# Patient Record
(demographics unavailable — no encounter records)

---

## 2025-02-05 NOTE — PHYSICAL EXAM
[de-identified] : Constitutional: The patient appears well developed, well nourished. Examination of patients ability to communicate functionally was normal.       Neurologic: Coordination is normal. Alert and oriented to time, place and person. No evidence of mood disorder, calm affect.           RIGHT  KNEE  : Inspection of the knee is as follows: moderate effusion. Positive diffuse at the knee and lower leg.  ecchymosis, no streaking, , no atrophy, no deformities of the quad tendon and no deformities of patellar tendon.  THERE IS MILD ERYTHEMA DISTAL LOWER LEG ANTEROLATERAL LEG. B/L LE EDEMA     Palpation of the knee is as follows: medial joint line tenderness, lateral joint line tenderness, medial facet of patella tenderness, lateral facet of patella tenderness and crepitus. no palpable masses and no increased warmth.       Knee Range of Motion is as follows in degrees:        Extension: 0    Flexion: 95        Strength examination of the knee is as follows:    Quadriceps strength is 5/5    Hamstring strength is 5/5       Ligament Stability and Special Test ligamentously stable, negative anterior draw, negative Lachman test, negative posterior draw and no varus or valgus instability.    negative McMurrays test and able to do active straight leg raise without an extensor lag.       Neurological examination of the knee is as follows: light touch is intact throughout.       Gait and function is as follows:  antalgic gait.CANE

## 2025-02-05 NOTE — DISCUSSION/SUMMARY
[de-identified] : Diagnosis Knee Osteoarthritis    VANESSA COLLAZO 70 year  F was seen and evaluated in the office today. Following evaluation, and history of the patient's condition at length, the pathology was explained in full to the patient in layman's terms. Patient has Knee Osteoarthritis. Osteoarthritis is a degenerative joint disease where the cartilage in the knee gradually wears away, leading to pain, stiffness, and reduced mobility. Initially, symptoms may be mild, however this is a progressive condition, and the pain is expected to become more severe and persistent. Advanced stages of knee OA can result in significant disability, making daily activities challenging. I discussed with the patient that since this condition is expected to continue to worsen, they will eventually need a total knee replacement in their life time.   In the interim, discussed with patient several different treatment options regarding managing the gradual loss of function associated with knee OA, along with specific risks and benefits. Nonsurgical options including but not limited to Corticosteroid Injection, Visco Supplementation, Meloxicam 15mg, Medrol Dose Pack, along with activity modification such as non-impact exercise and organized physical therapy. The risk of morbidity associated with proposed treatments were discussed.   Furthermore, discussed with VANESSA that they could also delay any immediate treatment options and continue to observe and self-care for the discussed problem. Discussed Home Exercise Programs as well as Rest, Ice and elevation. Patient had ample time to ask any questions about todays visit and the diagnosis, and all questions were answered. Patient verbally expressed they understand the plan going forward.   --   At this time, indicated patient for Meloxicam 15mg due to pain and inflammation of the knee.   --Patient reports that she has already been taking Meloxicam with minimal relief    --   At this time, patient is indicated for physical therapy due to their reduced ROM and weakness. Discussed with patient the benefits of physical therapy due to their current pain and limited function.   -At this time the patient declined physical therapy, reports that they wish to proceed with home exercise program. Discussed proper exercise with patient at this time, demonstrated in office. --  At this time the patient was advised on weight management. Discussed that healthy lifestyle choices including dieting and exercise can promote healthy weight management.   The patient has been informed they need to get their BMI under 40 to be a candidate for the procedure. They understand that having a BMI over 40 puts them at high risk for infection. They understand that if they get an infection in bone, it can be there for life. The importance of addressing weight loss has been stressed and the patient's questions were answered.   The Risks, benefits, alternatives and expectations of the proposed procedure, as well as non-operative management, were discussed at length with the patient, as well as the procedure itself and the expected recovery period. The possible need for post operative Physical Therapy was also discussed. The patient was allowed as much time as necessary to ask all appropriate questions and they were answered to the patient's satisfaction.   Patient is indicated for RT TKA. Patient was advised that due to recent fall and bruising, trauma to the knee, patient would need to delay TKA. Also discussed with patient that due to the BLE Lymphedema, she should attend the local lymphedema clinic. Patient admits that she was has previously been advised on water pill and use of BLE Stockings.  Patient was invited to TJS Patient advised to consult her pcp regarding todays discussion to discuss further eval for BLE lymphedema and the redness RLE. Patient prescribed Cipro 500 BID x 7 days  Patient was advised to follow up in 2 months to further evaluate, or sooner if symptoms worsen.

## 2025-02-05 NOTE — HISTORY OF PRESENT ILLNESS
[de-identified] : 02/05/2025  VANESSA COLLAZO 70 year y/o F presents today for RT KNEE PAIN. She has noted pain prior to the fall.     Date of Onset: Many years ago but pain increased after a fall on 01/28/25 Mechanism of injury: Patient reports she fell on 01/28/25   Pain:    At Rest: 8/10    With Activity: 0/10   Have you been treated for this in the past? Patient reports she was seen in [place] ER after she fell. Patient report she had a most recent XR in ER but does not have images. Patient reports she was seeing Dr. Andrade for gel injections and CSI injections in the past.  OTC/Rx Medication:  Heat, Ice, Elevation: Previous Imaging/Studies:

## 2025-03-17 NOTE — HISTORY OF PRESENT ILLNESS
[de-identified] : 03/17/2025  VANESSA COLLAZO 70 year y/o F presents today for follow up on the right knee.  Treatments since last visit: Patient reports she finished the cipro rx from us. Patient reports she saw vascular and was put on doxy x 10 days. Patient reports she saw her vascular DrFina and had a sonogram of the RLE. Patient reports she lost weight since last visit Changes Since Last Visit: Patient reports R knee pain is about the same since last visit.     02/05/2025  VANESSA COLLAZO 70 year y/o F presents today for RT KNEE PAIN. She has noted pain prior to the fall.   Date of Onset: Many years ago but pain increased after a fall on 01/28/25 Mechanism of injury: Patient reports she fell on 01/28/25   Pain:    At Rest: 8/10    With Activity: 0/10   Have you been treated for this in the past? Patient reports she was seen in [place] ER after she fell. Patient report she had a most recent XR in ER but does not have images. Patient reports she was seeing Dr. Andrade for gel injections and CSI injections in the past.  OTC/Rx Medication:  Heat, Ice, Elevation: Previous Imaging/Studies:

## 2025-03-17 NOTE — PHYSICAL EXAM
[de-identified] : Constitutional: The patient appears well developed, well nourished. Examination of patients ability to communicate functionally was normal.       Neurologic: Coordination is normal. Alert and oriented to time, place and person. No evidence of mood disorder, calm affect.           RIGHT  KNEE  : Inspection of the knee is as follows: moderate effusion. Positive diffuse at the knee and lower leg.  ecchymosis, no streaking, , no atrophy, no deformities of the quad tendon and no deformities of patellar tendon.  THERE IS MILD ERYTHEMA DISTAL LOWER LEG ANTEROLATERAL LEG. B/L LE EDEMA     Palpation of the knee is as follows: medial joint line tenderness, lateral joint line tenderness, medial facet of patella tenderness, lateral facet of patella tenderness and crepitus. no palpable masses and no increased warmth.       Knee Range of Motion is as follows in degrees:        Extension: 0    Flexion: 95        Strength examination of the knee is as follows:    Quadriceps strength is 5/5    Hamstring strength is 5/5       Ligament Stability and Special Test ligamentously stable, negative anterior draw, negative Lachman test, negative posterior draw and no varus or valgus instability.    negative McMurrays test and able to do active straight leg raise without an extensor lag.       Neurological examination of the knee is as follows: light touch is intact throughout.       Gait and function is as follows:  antalgic gait.CANE

## 2025-03-17 NOTE — DISCUSSION/SUMMARY
[de-identified] : Diagnosis Knee Osteoarthritis    VANESSA COLLAZO 70 year  F was seen and evaluated in the office today. Following evaluation, and history of the patient's condition at length, the pathology was explained in full to the patient in layman's terms. Patient has Knee Osteoarthritis. Osteoarthritis is a degenerative joint disease where the cartilage in the knee gradually wears away, leading to pain, stiffness, and reduced mobility. Initially, symptoms may be mild, however this is a progressive condition, and the pain is expected to become more severe and persistent. Advanced stages of knee OA can result in significant disability, making daily activities challenging. I discussed with the patient that since this condition is expected to continue to worsen, they will eventually need a total knee replacement in their life time.   In the interim, discussed with patient several different treatment options regarding managing the gradual loss of function associated with knee OA, along with specific risks and benefits. Nonsurgical options including but not limited to Corticosteroid Injection, Visco Supplementation, Meloxicam 15mg, Medrol Dose Pack, along with activity modification such as non-impact exercise and organized physical therapy. The risk of morbidity associated with proposed treatments were discussed.   Furthermore, discussed with VANESSA that they could also delay any immediate treatment options and continue to observe and self-care for the discussed problem. Discussed Home Exercise Programs as well as Rest, Ice and elevation. Patient had ample time to ask any questions about todays visit and the diagnosis, and all questions were answered. Patient verbally expressed they understand the plan going forward.  --  At this time, indicated patient for Meloxicam 15mg due to pain and inflammation of the knee.   --Patient reports that she has already been taking Meloxicam with minimal relief  -Patient wants to proceed with Right TKA.  She can have it done as long as she is infx free.  She understands due to lymphedema and PVD she is at increased Risk of infection. She understands this  Assessment & Indication: The patient has progressively severe knee pain and disability secondary to degenerative joint disease (DJD) and has failed extensive nontreatments, including activity modification, analgesic medications, and therapeutic exercise. Based on persistent symptoms and lack of response to non-surgical management, I have recommended a RIGHT Total Knee Replacement (TKR).  Procedure Discussion: A detailed discussion was held regarding the nature of the total knee replacement procedure, including surgical steps, expected recovery timeline, and anticipated outcomes. A model of the implant to be used was utilized to demonstrate the various bearing surfaces and functionality.  Expected Recovery & Rehabilitation: The patient was informed of the expected time course for return of function and pain relief. We discussed the importance of compliance with postoperative instructions to facilitate optimal recovery and minimize complications. Emphasis was placed on active participation in rehabilitation and its impact on both short- and long-term outcomes. The discharge plan includes home care nursing and physical therapy, provided it is appropriate and covered by the patients insurance. The patient was encouraged to arrange for assistance at home following surgery.  Outcomes & Expectations: We reviewed the expected surgical outcomes, the likelihood of satisfaction after full recovery, and potential causes of dissatisfaction, including the possibility of recurrent pain, lack of improvement, or worsening pain.  Risks & Complications: The patient was informed in detail about the risks associated with total knee replacement, including but not limited to:  Surgical risks: Infection, wound complications, bone fracture, tendon or ligament injury, nerve injury, vascular injury, hemorrhage, stiffness, instability, persistent pain, fixation failure, need for reoperation or manipulation under anesthesia.  Perioperative medical risks: Deep vein thrombosis (DVT), pulmonary embolism, heart attack, stroke, cardiopulmonary complications, and other risks related to medical comorbidities, including elevated body mass index (BMI).  Anesthesia risks: Potential complications related to anesthesia, including death, were also discussed. Though I defer to the anesthesia team to discuss complications of anesthesia procedures.  To mitigate these risks, we will use sterile technique, perioperative antibiotics, and DVT prophylaxis when appropriate. The patient will be monitored postoperatively in the office setting to track recovery progress.  Implant Selection & Durability: We discussed the durability of prosthetic knees and potential long-term concerns, including wear, osteolysis, and loosening. I plan to use a [employer] Legion, possibly uncemented to be determined based on bone quality implant, which I feel most comfortable utilizing for primary TKR. If the patient prefers a different implant brand/type, they may request it, and I will consider the request or suggest an additional surgical opinion from a surgeon using that brand.  Preoperative Medical Clearance: The patient was advised of the need for a preoperative medical risk evaluation by their primary care physician (PCP). Additional subspecialty clearances, such as cardiac evaluation, may be required based on the PCPs assessment.  Informed Consent & Next Steps: The risks, benefits, and alternatives to surgery were discussed at length. The patient actively participated in the discussion, had their questions answered, and agreed to proceed with elective TKR. They were instructed to coordinate with my surgical team for scheduling, preoperative testing, and medical optimization.  Follow-up will be arranged for surgery, or sooner if needed.

## 2025-07-16 NOTE — PHYSICAL EXAM
[de-identified] : Constitutional: The patient appears well developed, well nourished. Examination of patients ability to communicate functionally was normal.       Neurologic: Coordination is normal. Alert and oriented to time, place and person. No evidence of mood disorder, calm affect.           RIGHT  KNEE  : Inspection of the knee is as follows: moderate effusion. Positive diffuse at the knee and lower leg.  ecchymosis, no streaking, , no atrophy, no deformities of the quad tendon and no deformities of patellar tendon.  THERE IS MILD ERYTHEMA DISTAL LOWER LEG ANTEROLATERAL LEG. B/L LE EDEMA     Palpation of the knee is as follows: medial joint line tenderness, lateral joint line tenderness, medial facet of patella tenderness, lateral facet of patella tenderness and crepitus. no palpable masses and no increased warmth.       Knee Range of Motion is as follows in degrees:        Extension: 0    Flexion: 95        Strength examination of the knee is as follows:    Quadriceps strength is 5/5    Hamstring strength is 5/5       Ligament Stability and Special Test ligamentously stable, negative anterior draw, negative Lachman test, negative posterior draw and no varus or valgus instability.    negative McMurrays test and able to do active straight leg raise without an extensor lag.       Neurological examination of the knee is as follows: light touch is intact throughout.       Gait and function is as follows:  antalgic gait.WALKER

## 2025-07-16 NOTE — HISTORY OF PRESENT ILLNESS
[de-identified] : 07/16/2025  VANESSA COLLAZO 70 year y/o F presents today for follow up on right knee. Patient reports she would like to discuss surgery options. Patient is ambulating with a walker  Treatments since last visit: Patient saw lymphedema specialist for leg swelling , he gave lymphatic drainage massages and compression garments for legs.  Changes Since Last Visit: Patient reports knee is still in pain   03/17/2025  VANESSA COLLAZO 70 year y/o F presents today for follow up on the right knee.  Treatments since last visit: Patient reports she finished the cipro rx from us. Patient reports she saw vascular and was put on doxy x 10 days. Patient reports she saw her vascular DrFina and had a sonogram of the RLE. Patient reports she lost weight since last visit Changes Since Last Visit: Patient reports R knee pain is about the same since last visit.     02/05/2025  VANESSA COLLAZO 70 year y/o F presents today for RT KNEE PAIN. She has noted pain prior to the fall.   Date of Onset: Many years ago but pain increased after a fall on 01/28/25 Mechanism of injury: Patient reports she fell on 01/28/25   Pain:    At Rest: 8/10    With Activity: 0/10   Have you been treated for this in the past? Patient reports she was seen in [place] ER after she fell. Patient report she had a most recent XR in ER but does not have images. Patient reports she was seeing Dr. Andrade for gel injections and CSI injections in the past.  OTC/Rx Medication:  Heat, Ice, Elevation: Previous Imaging/Studies:

## 2025-07-16 NOTE — DISCUSSION/SUMMARY
[de-identified] : Diagnosis Knee Osteoarthritis   Her lymphedema is much improved since last visit.  Plan is for Right TKA She understands although lymphedema is improved she still has risk of infection.     VANESSA COLLAZO 70 year  F was seen and evaluated in the office today. Following evaluation, and history of the patient's condition at length, the pathology was explained in full to the patient in layman's terms. Patient has Knee Osteoarthritis. Osteoarthritis is a degenerative joint disease where the cartilage in the knee gradually wears away, leading to pain, stiffness, and reduced mobility. Initially, symptoms may be mild, however this is a progressive condition, and the pain is expected to become more severe and persistent. Advanced stages of knee OA can result in significant disability, making daily activities challenging. I discussed with the patient that since this condition is expected to continue to worsen, they will eventually need a total knee replacement in their life time.   Discussed with patient several different treatment options regarding managing the gradual loss of function associated with knee OA, along with specific risks and benefits. Nonsurgical options including but not limited to Corticosteroid Injection, Visco Supplementation, Meloxicam 15mg, Medrol Dose Pack, along with activity modification such as non-impact exercise and organized physical therapy. The risk of morbidity associated with proposed treatments were discussed. -- Assessment & Indication: The patient has progressively severe knee pain and disability secondary to degenerative joint disease (DJD) and has failed extensive non-operative treatments, including activity modification, analgesic medications, and therapeutic exercise. Based on persistent symptoms and lack of response to non-surgical management, I have recommended a RIGHT Total Knee Replacement (TKR).   Procedure Discussion: A detailed discussion was held regarding the nature of the total knee replacement procedure, including surgical steps, expected recovery timeline, and anticipated outcomes. A model of the implant to be used was utilized to demonstrate the various bearing surfaces and functionality.   Expected Recovery & Rehabilitation: The patient was informed of the expected time course for return of function and pain relief. We discussed the importance of compliance with postoperative instructions to facilitate optimal recovery and minimize complications. Emphasis was placed on active participation in rehabilitation and its impact on both short- and long-term outcomes. The discharge plan includes home care nursing and physical therapy, provided it is appropriate and covered by the patients insurance. The patient was encouraged to arrange for assistance at home following surgery.   Outcomes & Expectations: We reviewed the expected surgical outcomes, the likelihood of satisfaction after full recovery, and potential causes of dissatisfaction, including the possibility of recurrent pain, lack of improvement, or worsening pain.   Risks & Complications: The patient was informed in detail about the risks associated with total knee replacement, including but not limited to:   Surgical risks: Infection, wound complications, bone fracture, tendon or ligament injury, nerve injury, vascular injury, hemorrhage, stiffness, instability, persistent pain, fixation failure, need for reoperation or manipulation under anesthesia.   Perioperative medical risks: Deep vein thrombosis (DVT), pulmonary embolism, heart attack, stroke, cardiopulmonary complications, and other risks related to medical comorbidities, including elevated body mass index (BMI).   Anesthesia risks: Potential complications related to anesthesia, including death, were also discussed. Though I defer to the anesthesia team to discuss complications of anesthesia procedures.   To mitigate these risks, we will use sterile technique, perioperative antibiotics, and DVT prophylaxis when appropriate. The patient will be monitored postoperatively in the office setting to track recovery progress.   Implant Selection & Durability: We discussed the durability of prosthetic knees and potential long-term concerns, including wear, osteolysis, and loosening. I plan to use a [employer] Legion, possibly uncemented to be determined based on bone quality implant, which I feel most comfortable utilizing for primary TKR. If the patient prefers a different implant brand/type, they may request it, and I will consider the request or suggest an additional surgical opinion from a surgeon using that brand.   Preoperative Medical Clearance: The patient was advised of the need for a preoperative medical risk evaluation by their primary care physician (PCP). Additional subspecialty clearances, such as cardiac evaluation, may be required based on the PCPs assessment.   Informed Consent & Next Steps: The risks, benefits, and alternatives to surgery were discussed at length. The patient actively participated in the discussion, had their questions answered, and agreed to proceed with elective TKR. They were instructed to coordinate with my surgical team for scheduling, preoperative testing, and medical optimization.   Follow-up will be arranged for surgery, or sooner if needed.